# Patient Record
Sex: FEMALE | Race: WHITE
[De-identification: names, ages, dates, MRNs, and addresses within clinical notes are randomized per-mention and may not be internally consistent; named-entity substitution may affect disease eponyms.]

---

## 2018-05-18 ENCOUNTER — HOSPITAL ENCOUNTER (EMERGENCY)
Dept: HOSPITAL 68 - ERH | Age: 68
End: 2018-05-18
Payer: COMMERCIAL

## 2018-05-18 VITALS — SYSTOLIC BLOOD PRESSURE: 202 MMHG | DIASTOLIC BLOOD PRESSURE: 90 MMHG

## 2018-05-18 DIAGNOSIS — B02.9: ICD-10-CM

## 2018-05-18 DIAGNOSIS — G51.0: Primary | ICD-10-CM

## 2018-05-18 NOTE — CT SCAN REPORT
EXAMINATION:
CT HEAD WITHOUT CONTRAST
 
CLINICAL INFORMATION:
Severe headache. Facial droop.
 
COMPARISON:
No relevant prior imaging.
 
TECHNIQUE:
Contiguous axial imaging was performed from the skull base to vertex without
intravenous administration of contrast.
 
DLP:
619.37 mGy-cm
 
FINDINGS:
There is no acute intracranial hemorrhage or abnormal extra-axial collection.
No intracranial mass effect or midline shift. Lateral and third ventricles
are proportionate to the subarachnoid spaces. No hydrocephalus. Ill-defined
foci of hypoattenuation are visualized throughout the periventricular white
matter that most likely represent a chronic manifestation of small vessel
ischemia. Grossly no evidence of acute territorial infarct. The calvarium and
skull base are intact. Trace mastoid tip effusions. Paranasal sinuses are
well-aerated. Globes and orbits are symmetric.
 
IMPRESSION:
There are scattered chronic small vessel ischemic changes throughout the
periventricular white matter. No evidence of acute territorial infarct or
hemorrhage.

## 2018-05-18 NOTE — ED GENERAL ADULT
History of Present Illness
 
General
Chief Complaint: Neuro Symptoms/ Deficit
Stated Complaint: RIGHT SIDED FACIAL DROOP, HA
Source: patient, family
Exam Limitations: poor historian
 
Vital Signs & Intake/Output
Vital Signs & Intake/Output
 Vital Signs
 
 
Date Time Temp Pulse Resp B/P B/P Pulse O2 O2 Flow FiO2
 
     Mean Ox Delivery Rate 
 
 1254 98.2 78 18 202/90  99 Room Air  
 
 
 
Allergies
Coded Allergies:
Sulfa (Sulfonamide Antibiotics) (RASH 16)
morphine (RASH 16)
 
Reconcile Medications
Furosemide (Lasix) 20 MG TAB   1 TAB PO DAILY DEPENDENT EDEMA
Furosemide (Lasix) 20 MG TABLET   1 TAB PO DAILY EDEMA
Nystatin 100,000 U/GM POW   1 ARABELLA TOP BID CANDIDIASIS
     apply to affected area(s)
Nystatin 60 GM POWDER   1 ARABELLA TOP BID RASH
     apply to affected area(s)
 
Triage Note:
67F ARRIVES WITH LEFT SIDED FACIAL DROOP SINCE
 LAST NIGHT AND HEADACHE INTERMITTENT ALSO SINCE
 LAST NIGHT. LEFT HAND GRASP SLIGHTLY WEAKER THAN
 RIGHT. ABLE TO FOLLOW COMMANDS, SPEECH GENERALLY
 CLEAR. LEFT FACE IS NUMB COMPARED TO LEFT. UNABLE
 TO CRINKLE UP FOREHEAD. NO TEARING TO EYES.
 REPORTS SHE WAS DIAGNOSED WITH SHINGLES LAST WEEK
 TO FACE.
 TO FACE AND IS ON VALCYCLOVIR. ?BELLS PALSY
Triage Nurses Notes Reviewed? yes
Onset: Abrupt
Duration: day(s):
Timing: recent history
HPI:
 
 
 
18
I was notified to see the patient in triage for concern of possible stroke.  She
is a 67-year-old female who is status post varicella-zoster to the face who now 
presented with 3 days of left facial weakness, she denied slurred speech or new 
upper or lower extremity weakness.  She denied any visual complaints.  The 
patient was informed that she needed to have a CT scan and be evaluated.  She 
did allow the CAT scan to be done but subsequently walked out before the results
were available.  I suspect she has Bell's palsy possible Poston Downs syndrome.  
I called him at home and told them they must follow-up with the ophthalmologist 
tomorrow.
 
Past History
 
Travel History
Traveled to [place] past 21 day No
 
Medical History
Any Pertinent Medical History? see below for history
Neurological: NONE
EENT: NONE
Cardiovascular: hypertension, hyperlipidemia
Respiratory: NONE
Gastrointestinal: BLEEDING ULCER
Hepatic: NONE
Renal: NONE
Musculoskeletal: NONE
Psychiatric: insomnia
Endocrine: hypothyroidism, BORDERLINE DM
 
Surgical History
Surgical History: , hysterectomy
 
Psychosocial History
What is your primary language English
Tobacco Use: Current Daily Use
Daily Tobacco Use Amount/Type: => 5 Cigarettes daily
 
Family History
Hx Contributory? No
 
Review of Systems
 
Review of Systems
Constitutional:
Denies: fever. 
Respiratory:
Denies: short of breath. 
Cardiovascular:
Denies: chest pain. 
GI:
Reports: no symptoms. 
Genitourinary:
Reports: no symptoms. 
Musculoskeletal:
Reports: no symptoms. 
Skin:
Reports: see HPI. 
Neurological/Psychological:
Reports: other (left facial weakness). 
Hematologic/Endocrine:
Reports: no symptoms. 
 
Physical Exam
 
Physical Exam
General Appearance: alert, awake, anxious, mild distress
Head: atraumatic
Eyes:
Bilateral: normal appearance, PERRL, EOMI. 
Ears, Nose, Throat: normal pharynx
Neck: supple
Respiratory: no respiratory distress
Cardiovascular: regular rate/rhythm
Gastrointestinal: soft, non-tender
Back: decreased range of motion
Extremities: pedal edema
Neurologic/Psych: awake, alert, oriented x 3, facial droop
Skin: rash
 
Core Measures
ACS in differential dx? No
CVA/TIA Diagnosis: No
Sepsis Present: No
Sepsis Focused Exam Completed? No
 
Progress
Differential Diagnoses
I considered the following diagnoses in my evaluation of the patient: 
 
Plan of Care:
 
 
 
The patient walked out of the emergency department after being evaluated in 
triage and having the CT scan of the head done.
 
 
The CT scan was negative for evidence of stroke, her symptoms started 3 days 
ago.  She had no significant upper or lower extremity weakness, minimal reduced 
range of motion to left hip flexion from chronic pain.  She had an erythematous 
resolving rash to the left face.  She was diagnosed and treated for zoster.  She
had a left facial droop.  She was told to follow-up with the ophthalmologist 
tomorrow.  She was told to have her blood pressure repeated.
Initial ED EKG: none
 
Departure
 
Departure
Disposition: LEFT AGAINST MEDICAL ADVICE
Condition: Stable
Clinical Impression
Primary Impression: Bell's palsy
Secondary Impressions: Herpes zoster
Referrals:
Terrence Gonzalez (PCP/Family)
 
Departure Forms:
Customer Survey
General Discharge Information
Comments
 
Possible Makenzie Downs syndrome.
 
 
The patient walked out before full evaluation and discharge.
 
 
PATIENT: OFELIA PINEDA  MEDICAL RECORD NO: [id number]
PRESENT AGE: 67  PATIENT ACCOUNT NO: [id number]
: 46  LOCATION: [facility]
ORDERING PHYSICIAN: Jayant Nathan DO  
 
  SERVICE DATE: 
EXAM TYPE: CAT - CT HEAD WO IV CONTRAST
 
EXAMINATION:
CT HEAD WITHOUT CONTRAST
 
CLINICAL INFORMATION:
Severe headache. Facial droop.
 
COMPARISON:
No relevant prior imaging.
 
TECHNIQUE:
Contiguous axial imaging was performed from the skull base to vertex without
intravenous administration of contrast.
 
DLP:
619.37 mGy-cm
 
FINDINGS:
There is no acute intracranial hemorrhage or abnormal extra-axial collection.
No intracranial mass effect or midline shift. Lateral and third ventricles
are proportionate to the subarachnoid spaces. No hydrocephalus. Ill-defined
foci of hypoattenuation are visualized throughout the periventricular white
matter that most likely represent a chronic manifestation of small vessel
ischemia. Grossly no evidence of acute territorial infarct. The calvarium and
skull base are intact. Trace mastoid tip effusions. Paranasal sinuses are
well-aerated. Globes and orbits are symmetric.
 
IMPRESSION:
There are scattered chronic small vessel ischemic changes throughout the
periventricular white matter. No evidence of acute territorial infarct or
hemorrhage.
 
 
DICTATED BY: Asaf Schwartz MD 
DATE/TIME DICTATED:18
:[employer] 
DATE/TIME TRANSCRIBED:18
 
CONFIDENTIAL, DO NOT COPY WITHOUT APPROPRIATE AUTHORIZATION.
 
 <Electronically signed in Other Vendor System>                                 
          
                                           SIGNED BY: Asaf Schwartz MD 
 
 
 
 
 
 
The patient has been treated for varicella-zoster.  She will follow-up in the ER
tomorrow if she is unable to get into see the ophthalmologist.
 
Critical Care Note
 
Critical Care Note
Critical Care Time: non-applicable